# Patient Record
Sex: MALE | Race: OTHER
[De-identification: names, ages, dates, MRNs, and addresses within clinical notes are randomized per-mention and may not be internally consistent; named-entity substitution may affect disease eponyms.]

---

## 2022-10-06 ENCOUNTER — HOSPITAL ENCOUNTER (EMERGENCY)
Dept: HOSPITAL 54 - ER | Age: 42
Discharge: HOME | End: 2022-10-06
Payer: SELF-PAY

## 2022-10-06 VITALS — SYSTOLIC BLOOD PRESSURE: 143 MMHG | DIASTOLIC BLOOD PRESSURE: 52 MMHG

## 2022-10-06 VITALS — HEIGHT: 64 IN | WEIGHT: 154 LBS | BODY MASS INDEX: 26.29 KG/M2
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
## 2022-10-06 DIAGNOSIS — S70.01XA: Primary | ICD-10-CM

## 2022-10-06 DIAGNOSIS — M54.50: ICD-10-CM
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
